# Patient Record
Sex: MALE | Race: WHITE | NOT HISPANIC OR LATINO | URBAN - METROPOLITAN AREA
[De-identification: names, ages, dates, MRNs, and addresses within clinical notes are randomized per-mention and may not be internally consistent; named-entity substitution may affect disease eponyms.]

---

## 2023-12-13 ENCOUNTER — OFFICE VISIT (OUTPATIENT)
Dept: URGENT CARE | Facility: CLINIC | Age: 33
End: 2023-12-13
Payer: OTHER GOVERNMENT

## 2023-12-13 VITALS
TEMPERATURE: 97.8 F | BODY MASS INDEX: 31.08 KG/M2 | HEIGHT: 75 IN | WEIGHT: 250 LBS | DIASTOLIC BLOOD PRESSURE: 91 MMHG | OXYGEN SATURATION: 97 % | RESPIRATION RATE: 14 BRPM | HEART RATE: 75 BPM | SYSTOLIC BLOOD PRESSURE: 135 MMHG

## 2023-12-13 DIAGNOSIS — B34.9 VIRAL SYNDROME: Primary | ICD-10-CM

## 2023-12-13 DIAGNOSIS — R19.7 DIARRHEA, UNSPECIFIED TYPE: ICD-10-CM

## 2023-12-13 PROCEDURE — 99204 OFFICE O/P NEW MOD 45 MIN: CPT | Performed by: PHYSICIAN ASSISTANT

## 2023-12-13 ASSESSMENT — ENCOUNTER SYMPTOMS
EYES NEGATIVE: 1
ABDOMINAL PAIN: 1
NEUROLOGICAL NEGATIVE: 1
DIARRHEA: 1
VOMITING: 0
FATIGUE: 1
SORE THROAT: 1
ALLERGIC/IMMUNOLOGIC NEGATIVE: 1
HEMATOLOGIC/LYMPHATIC NEGATIVE: 1
MUSCULOSKELETAL NEGATIVE: 1
COUGH: 1
PSYCHIATRIC NEGATIVE: 1
NAUSEA: 0
FEVER: 0
ENDOCRINE NEGATIVE: 1

## 2023-12-13 ASSESSMENT — PAIN SCALES - GENERAL: PAINLEVEL: 2

## 2023-12-13 NOTE — PROGRESS NOTES
"Subjective   Patient ID: Chan Kemp is a 33 y.o. male.      History provided by:  Patient   used: No    Abdominal Pain  Associated symptoms include diarrhea. Pertinent negatives include no fever, nausea or vomiting.   Diarrhea  Associated symptoms: abdominal pain    Associated symptoms: no fever and no vomiting    Sore Throat   Associated symptoms include abdominal pain, congestion, coughing and diarrhea. Pertinent negatives include no vomiting.     This is a 33 yr old male here for diarrhea, sore throat, cough, URI sxs, fatigue and abdominal discomfort x 3 days. No dizziness, lightheadedness, blood or mucous in the stool, n/v or fever. Pt states c diff exposure through family member and wants test ordered.     Review of Systems   Constitutional:  Positive for fatigue. Negative for fever.   HENT:  Positive for congestion and sore throat.    Eyes: Negative.    Respiratory:  Positive for cough.    Gastrointestinal:  Positive for abdominal pain and diarrhea. Negative for nausea and vomiting.   Endocrine: Negative.    Genitourinary: Negative.    Musculoskeletal: Negative.    Skin: Negative.    Allergic/Immunologic: Negative.    Neurological: Negative.    Hematological: Negative.    Psychiatric/Behavioral: Negative.     All other systems reviewed and are negative.  No past medical history on file.  No current outpatient medications on file prior to visit.     No current facility-administered medications on file prior to visit.     No Known Allergies  BP (!) 135/91   Pulse 75   Temp 36.6 °C (97.8 °F) (Temporal)   Resp 14   Ht 1.905 m (6' 3\")   Wt 113 kg (250 lb)   SpO2 97%   BMI 31.25 kg/m²   Objective   Physical Exam  Vitals and nursing note reviewed.   Constitutional:       Appearance: Normal appearance.   HENT:      Head: Normocephalic and atraumatic.      Right Ear: Tympanic membrane and ear canal normal.      Left Ear: Tympanic membrane and ear canal normal.      Mouth/Throat:     "  Mouth: Mucous membranes are moist.      Pharynx: Oropharynx is clear.   Cardiovascular:      Rate and Rhythm: Normal rate and regular rhythm.   Pulmonary:      Effort: Pulmonary effort is normal.      Breath sounds: Normal breath sounds.   Abdominal:      Palpations: Abdomen is soft.      Tenderness: There is no abdominal tenderness. There is no guarding or rebound.   Musculoskeletal:      Cervical back: Neck supple.   Lymphadenopathy:      Cervical: No cervical adenopathy.   Skin:     General: Skin is warm and dry.   Neurological:      General: No focal deficit present.      Mental Status: He is alert and oriented to person, place, and time.   Psychiatric:         Mood and Affect: Mood normal.         Behavior: Behavior normal.     Assessment:  Viral syndrome  Diarrhea    Plan:  Cdiff test ordered and pending  BRAT diet for diarrhea sxs, advance diet as tolerated  Increase clear fluids-gatorade or sports drinks  Pcp follow up this week if not improving or worsening  ER visit anytime 24/7 for acute worsening or changing condition

## 2023-12-13 NOTE — PATIENT INSTRUCTIONS
BRAT diet for diarrhea, bananas, rice, applesauce and toast  Advance diet as tolerated  Increase clear fluids, gatorade or sports drinks  Pcp follow up this week if not improving or worsening  ER visit any time 24/7 for acute worsening or changing condition  
Normal rate, regular rhythm.  Heart sounds S1, S2.

## 2024-05-31 ENCOUNTER — OFFICE VISIT (OUTPATIENT)
Dept: URGENT CARE | Facility: CLINIC | Age: 34
End: 2024-05-31
Payer: OTHER GOVERNMENT

## 2024-05-31 ENCOUNTER — HOSPITAL ENCOUNTER (OUTPATIENT)
Dept: RADIOLOGY | Facility: CLINIC | Age: 34
Discharge: HOME | End: 2024-05-31
Payer: OTHER GOVERNMENT

## 2024-05-31 VITALS
SYSTOLIC BLOOD PRESSURE: 138 MMHG | BODY MASS INDEX: 31.25 KG/M2 | HEART RATE: 90 BPM | RESPIRATION RATE: 20 BRPM | TEMPERATURE: 98.3 F | OXYGEN SATURATION: 98 % | DIASTOLIC BLOOD PRESSURE: 84 MMHG | WEIGHT: 250 LBS

## 2024-05-31 DIAGNOSIS — M79.671 RIGHT FOOT PAIN: Primary | ICD-10-CM

## 2024-05-31 DIAGNOSIS — M79.671 RIGHT FOOT PAIN: ICD-10-CM

## 2024-05-31 PROCEDURE — 99213 OFFICE O/P EST LOW 20 MIN: CPT | Performed by: PHYSICIAN ASSISTANT

## 2024-05-31 PROCEDURE — 73630 X-RAY EXAM OF FOOT: CPT | Mod: RIGHT SIDE | Performed by: RADIOLOGY

## 2024-05-31 PROCEDURE — 96372 THER/PROPH/DIAG INJ SC/IM: CPT | Performed by: PHYSICIAN ASSISTANT

## 2024-05-31 PROCEDURE — 73630 X-RAY EXAM OF FOOT: CPT | Mod: RT

## 2024-05-31 RX ORDER — DICLOFENAC SODIUM 50 MG/1
50 TABLET, DELAYED RELEASE ORAL 2 TIMES DAILY
Qty: 20 TABLET | Refills: 0 | Status: SHIPPED | OUTPATIENT
Start: 2024-05-31 | End: 2024-06-10

## 2024-05-31 RX ORDER — KETOROLAC TROMETHAMINE 15 MG/ML
15 INJECTION, SOLUTION INTRAMUSCULAR; INTRAVENOUS ONCE
Status: COMPLETED | OUTPATIENT
Start: 2024-05-31 | End: 2024-05-31

## 2024-05-31 RX ADMIN — KETOROLAC TROMETHAMINE 15 MG: 15 INJECTION, SOLUTION INTRAMUSCULAR; INTRAVENOUS at 13:52

## 2024-05-31 ASSESSMENT — PAIN SCALES - GENERAL: PAINLEVEL: 10-WORST PAIN EVER

## 2024-05-31 NOTE — PATIENT INSTRUCTIONS
Assessment/Plan   Problem List Items Addressed This Visit       Right foot pain - Primary    Relevant Medications    ketorolac (Toradol) injection 15 mg (Completed)    diclofenac (Voltaren) 50 mg EC tablet    Other Relevant Orders    XR foot right 3+ views

## 2024-05-31 NOTE — PROGRESS NOTES
Subjective   Patient ID: Chan Kemp is a 34 y.o. male who presents for Foot Pain.  Patient is here with complaints of right-sided foot pain without inciting injury over the course of the last 3 days.  Patient does note that he took Tylenol over-the-counter which seem to help out with the pain yesterday but has not taken any over-the-counter medications today.  Patient denies any associated fevers or chills.  Denies any pain further up the leg.  Notes that ambulation makes the pain worse also dorsiflexion plantarflexion make the pain worse.  The pain radiates into the anterior aspect of the ankle and originates along the medial aspect of the midfoot.  Patient works as an  and notes that he is on his feet essentially for the entirety of his workday.  Notes that the pain has worsened after working all day yesterday.  Patient denies any associated chest pain or shortness of breath history of DVTs or VTE.  No recent immobilization or surgery    No past medical history on file.      The remainder of the systems were reviewed and are negative unless noted above      Objective   /84   Pulse 90   Temp 36.8 °C (98.3 °F)   Resp 20   Wt 113 kg (250 lb)   SpO2 98%   BMI 31.25 kg/m²   Physical Exam  Constitutional:       General: He is not in acute distress.     Appearance: Normal appearance. He is not ill-appearing, toxic-appearing or diaphoretic.   HENT:      Head: Normocephalic and atraumatic.      Mouth/Throat:      Mouth: Mucous membranes are moist.      Pharynx: Oropharynx is clear.   Eyes:      Conjunctiva/sclera: Conjunctivae normal.   Cardiovascular:      Rate and Rhythm: Normal rate and regular rhythm.      Heart sounds: No murmur heard.  Pulmonary:      Effort: Pulmonary effort is normal. No respiratory distress.      Breath sounds: Normal breath sounds. No wheezing.   Musculoskeletal:         General: Swelling and tenderness (Diffuse tenderness over the medial midfoot.  Edema of the  same without any erythema or ecchymosis) present. No deformity. Normal range of motion.      Cervical back: Normal range of motion and neck supple.      Right lower leg: No edema.      Left lower leg: No edema.   Skin:     General: Skin is warm and dry.      Findings: No bruising, erythema or rash.   Neurological:      General: No focal deficit present.      Mental Status: He is alert and oriented to person, place, and time.      Gait: Gait normal.         Assessment/Plan   Problem List Items Addressed This Visit       Right foot pain - Primary    Relevant Medications    ketorolac (Toradol) injection 15 mg (Completed)    diclofenac (Voltaren) 50 mg EC tablet    Other Relevant Orders    XR foot right 3+ views         Patient with pain and swelling to the medial midfoot worse with ambulation.  Radiographs were obtained to rule out acute bony injury.  There is no erythema or swelling further into the proximal right lower extremity.  No heat emanating from the area.  I have low suspicion of cellulitis there is no break in the skin.  No history of penetrating trauma.  Patient without historical risk factors for DVT, immobilization, surgery, air travel recently or history of DVT VTE.  On my review of the radiographs there does appear to be some narrowing at the talonavicular joint along the medial aspect.  On physical exam when I have the patient stand there is some collapse of the midfoot and arch.  I suspect that this is contributing to the patient's pain and I am recommending follow-up with podiatry.  Patient was treated here in office with Toradol IM and home-going on diclofenac I am recommending RICE therapy over the course of the weekend.